# Patient Record
Sex: FEMALE | Race: WHITE | NOT HISPANIC OR LATINO | Employment: FULL TIME | URBAN - METROPOLITAN AREA
[De-identification: names, ages, dates, MRNs, and addresses within clinical notes are randomized per-mention and may not be internally consistent; named-entity substitution may affect disease eponyms.]

---

## 2017-04-24 ENCOUNTER — OFFICE VISIT (OUTPATIENT)
Dept: MEDICAL GROUP | Facility: PHYSICIAN GROUP | Age: 47
End: 2017-04-24
Payer: COMMERCIAL

## 2017-04-24 VITALS
OXYGEN SATURATION: 96 % | DIASTOLIC BLOOD PRESSURE: 80 MMHG | HEIGHT: 71 IN | HEART RATE: 63 BPM | WEIGHT: 200 LBS | BODY MASS INDEX: 28 KG/M2 | TEMPERATURE: 97.9 F | SYSTOLIC BLOOD PRESSURE: 124 MMHG

## 2017-04-24 DIAGNOSIS — J01.00 ACUTE NON-RECURRENT MAXILLARY SINUSITIS: ICD-10-CM

## 2017-04-24 DIAGNOSIS — R09.81 SINUS CONGESTION: ICD-10-CM

## 2017-04-24 DIAGNOSIS — R05.9 COUGH: ICD-10-CM

## 2017-04-24 PROCEDURE — 99214 OFFICE O/P EST MOD 30 MIN: CPT | Performed by: FAMILY MEDICINE

## 2017-04-24 RX ORDER — FLUTICASONE PROPIONATE 50 MCG
1 SPRAY, SUSPENSION (ML) NASAL DAILY
Qty: 16 G | Refills: 1 | Status: SHIPPED | OUTPATIENT
Start: 2017-04-24 | End: 2019-01-30

## 2017-04-24 RX ORDER — AMOXICILLIN 875 MG/1
875 TABLET, COATED ORAL 2 TIMES DAILY
Qty: 20 TAB | Refills: 0 | Status: SHIPPED | OUTPATIENT
Start: 2017-04-24 | End: 2017-05-04

## 2017-04-24 RX ORDER — DEXAMETHASONE 4 MG/1
4 TABLET ORAL 2 TIMES DAILY
Qty: 10 TAB | Refills: 0 | Status: SHIPPED | OUTPATIENT
Start: 2017-04-24 | End: 2018-02-06 | Stop reason: SDUPTHER

## 2017-04-24 ASSESSMENT — PATIENT HEALTH QUESTIONNAIRE - PHQ9: CLINICAL INTERPRETATION OF PHQ2 SCORE: 0

## 2017-04-25 NOTE — PROGRESS NOTES
"Subjective:   Ghazal Dsouza is a 47 y.o. female here today for sinus congestion and cough    Sinus congestion  New problem: started about 1 week ago and is concurrent with cough. She has had sinus drainage along with runny nose; she denies any fever/chills/n/v but does report sick contacts.  She has tried some over the counter medications but these have not been beneficial.     Cough  New problem: started with the sinus congestion.  The cough has been productive.  Again she has tried over the counter medications but nothing has been beneficial.  Associated symptoms of runny nose and sinus congestion.          Current medicines (including changes today)  Current Outpatient Prescriptions   Medication Sig Dispense Refill   • amoxicillin (AMOXIL) 875 MG tablet Take 1 Tab by mouth 2 times a day for 10 days. 20 Tab 0   • dexamethasone (DECADRON) 4 MG Tab Take 1 Tab by mouth 2 times a day. 10 Tab 0   • fluticasone (FLONASE) 50 MCG/ACT nasal spray Spray 1 Spray in nose every day. 16 g 1   • fluticasone (FLONASE) 50 MCG/ACT nasal spray Spray 1 Spray in nose every day. (Patient not taking: Reported on 8/17/2015) 16 g 11   • IBUPROFEN PO Take  by mouth.         No current facility-administered medications for this visit.     She  has a past medical history of Allergy.    ROS   No chest pain, no shortness of breath, no abdominal pain  +sinus congestion and productive cough     Objective:     Blood pressure 124/80, pulse 63, temperature 36.6 °C (97.9 °F), height 1.803 m (5' 11\"), weight 90.719 kg (200 lb), SpO2 96 %. Body mass index is 27.91 kg/(m^2).   Physical Exam:  Alert, oriented in no acute distress.  Eye contact is good, speech goal directed, affect calm  HEENT: conjunctiva non-injected, sclera non-icteric.  Pinna normal. TM pearly gray on R; serous drainage behind the L  Oral mucous membranes pink; oropharynx with erythema   Neck No adenopathy or masses in the neck or supraclavicular regions.  Lungs: clear to " auscultation bilaterally with good excursion.  CV: regular rate and rhythm.  Abdomen: soft, nontender, No CVAT  Ext: no edema, color normal, vascularity normal, temperature normal        Assessment and Plan:   The following treatment plan was discussed     1. Acute non-recurrent maxillary sinusitis      uncontrolled; prescription for amoxicillin, dexamethasone, and flonase. monitor for tolerance and effect.    2. Cough      uncontrolled; symptom secondary to problem #1.    3. Sinus congestion      uncontrolled; symptom secondary to problem #1.        Followup: Return if symptoms worsen or fail to improve.

## 2017-04-25 NOTE — ASSESSMENT & PLAN NOTE
New problem: started about 1 week ago and is concurrent with cough. She has had sinus drainage along with runny nose; she denies any fever/chills/n/v but does report sick contacts.  She has tried some over the counter medications but these have not been beneficial.

## 2017-06-13 ENCOUNTER — PATIENT MESSAGE (OUTPATIENT)
Dept: MEDICAL GROUP | Facility: PHYSICIAN GROUP | Age: 47
End: 2017-06-13

## 2017-09-16 ENCOUNTER — HOSPITAL ENCOUNTER (OUTPATIENT)
Facility: MEDICAL CENTER | Age: 47
End: 2017-09-16
Payer: COMMERCIAL

## 2017-09-17 LAB
ALBUMIN SERPL BCP-MCNC: 3.6 G/DL (ref 3.2–4.9)
ALBUMIN/GLOB SERPL: 0.9 G/DL
ALP SERPL-CCNC: 97 U/L (ref 30–99)
ALT SERPL-CCNC: 16 U/L (ref 2–50)
ANION GAP SERPL CALC-SCNC: 8 MMOL/L (ref 0–11.9)
AST SERPL-CCNC: 19 U/L (ref 12–45)
BDY FAT % MEASURED: 36.3 %
BILIRUB SERPL-MCNC: 1.1 MG/DL (ref 0.1–1.5)
BP DIAS: 80 MMHG
BP SYS: 114 MMHG
BUN SERPL-MCNC: 14 MG/DL (ref 8–22)
CALCIUM SERPL-MCNC: 9.4 MG/DL (ref 8.5–10.5)
CHLORIDE SERPL-SCNC: 101 MMOL/L (ref 96–112)
CHOLEST SERPL-MCNC: 175 MG/DL (ref 100–199)
CO2 SERPL-SCNC: 29 MMOL/L (ref 20–33)
CREAT SERPL-MCNC: 0.85 MG/DL (ref 0.5–1.4)
DIABETES HTDIA: NO
EVENT NAME HTEVT: NORMAL
GFR SERPL CREATININE-BSD FRML MDRD: >60 ML/MIN/1.73 M 2
GLOBULIN SER CALC-MCNC: 3.9 G/DL (ref 1.9–3.5)
GLUCOSE SERPL-MCNC: 100 MG/DL (ref 65–99)
HDLC SERPL-MCNC: 53 MG/DL
HYPERTENSION HTHYP: NO
LDLC SERPL CALC-MCNC: 101 MG/DL
POTASSIUM SERPL-SCNC: 4.9 MMOL/L (ref 3.6–5.5)
PROT SERPL-MCNC: 7.5 G/DL (ref 6–8.2)
SCREENING LOC CITY HTCIT: NORMAL
SCREENING LOC STATE HTSTA: NORMAL
SCREENING LOCATION HTLOC: NORMAL
SODIUM SERPL-SCNC: 138 MMOL/L (ref 135–145)
SUBSCRIBER ID HTSID: NORMAL
TRIGL SERPL-MCNC: 104 MG/DL (ref 0–149)

## 2018-02-06 ENCOUNTER — OFFICE VISIT (OUTPATIENT)
Dept: MEDICAL GROUP | Facility: PHYSICIAN GROUP | Age: 48
End: 2018-02-06
Payer: COMMERCIAL

## 2018-02-06 VITALS
TEMPERATURE: 98.4 F | BODY MASS INDEX: 29.68 KG/M2 | OXYGEN SATURATION: 98 % | SYSTOLIC BLOOD PRESSURE: 124 MMHG | HEIGHT: 71 IN | HEART RATE: 69 BPM | DIASTOLIC BLOOD PRESSURE: 84 MMHG | WEIGHT: 212 LBS

## 2018-02-06 DIAGNOSIS — J02.9 SORE THROAT: ICD-10-CM

## 2018-02-06 DIAGNOSIS — J06.9 VIRAL UPPER RESPIRATORY TRACT INFECTION: ICD-10-CM

## 2018-02-06 PROCEDURE — 99214 OFFICE O/P EST MOD 30 MIN: CPT | Performed by: FAMILY MEDICINE

## 2018-02-06 RX ORDER — DEXAMETHASONE 4 MG/1
4 TABLET ORAL 2 TIMES DAILY
Qty: 10 TAB | Refills: 0 | Status: SHIPPED | OUTPATIENT
Start: 2018-02-06 | End: 2019-01-30

## 2018-02-06 NOTE — LETTER
February 6, 2018         Patient: Ghazal Dsouza   YOB: 1970   Date of Visit: 2/6/2018           To Whom it May Concern:    Ghazal Dsouza was seen in my clinic on 2/6/2018. She may return to work on 2/12/2018.    If you have any questions or concerns, please don't hesitate to call.        Sincerely,           Marbella Porras D.O.  Electronically Signed

## 2018-02-06 NOTE — PROGRESS NOTES
"Subjective:   Ghazal Dsouza is a 48 y.o. female here today for sore throat    Sore throat  New problem - started 4 days ago; also reports runny nose, subjective fever/chills, sinus congestion/pressure, watery eyes, cough, HA - denies n/v/d - tried OTC medication: flonase, mucus reflief and these were mildly beneficial.          Current medicines (including changes today)  Current Outpatient Prescriptions   Medication Sig Dispense Refill   • dexamethasone (DECADRON) 4 MG Tab Take 1 Tab by mouth 2 times a day. 10 Tab 0   • fluticasone (FLONASE) 50 MCG/ACT nasal spray Spray 1 Spray in nose every day. 16 g 1   • fluticasone (FLONASE) 50 MCG/ACT nasal spray Spray 1 Spray in nose every day. 16 g 11   • IBUPROFEN PO Take  by mouth.         No current facility-administered medications for this visit.      She  has a past medical history of Allergy.    ROS   No chest pain, no shortness of breath, no abdominal pain  +sore throat     Objective:     Blood pressure 124/84, pulse 69, temperature 36.9 °C (98.4 °F), height 1.803 m (5' 11\"), weight 96.2 kg (212 lb), SpO2 98 %. Body mass index is 29.57 kg/m².   Physical Exam:  Alert, oriented in no acute distress.  Eye contact is good, speech goal directed, affect calm  HEENT: conjunctiva non-injected, sclera non-icteric.  Pinna normal. TM pearly gray. Posterior oropharynx with mild erythema  Oral mucous membranes pink and moist with no lesions.  Neck No adenopathy or masses in the neck or supraclavicular regions.  Lungs: clear to auscultation bilaterally with good excursion.  CV: regular rate and rhythm.  Abdomen: soft, nontender, No CVAT  Ext: no edema, color normal, vascularity normal, temperature normal        Assessment and Plan:   The following treatment plan was discussed     1. Viral upper respiratory tract infection      Uncontrolled. Reviewed with patient to stay well hydrated and get good rest; prescription for dexamethasone provided; recommend over-the-counter " Mucinex   2. Sore throat      Uncontrolled; symptoms secondary to problem #1       Followup: Return if symptoms worsen or fail to improve.

## 2018-02-06 NOTE — ASSESSMENT & PLAN NOTE
New problem - started 4 days ago; also reports runny nose, subjective fever/chills, sinus congestion/pressure, watery eyes, cough, HA - denies n/v/d - tried OTC medication: flonase, mucus reflief and these were mildly beneficial.

## 2018-09-20 ENCOUNTER — HOSPITAL ENCOUNTER (OUTPATIENT)
Facility: MEDICAL CENTER | Age: 48
End: 2018-09-20
Payer: COMMERCIAL

## 2018-09-20 LAB
BDY FAT % MEASURED: 38.6 %
BP DIAS: 70 MMHG
BP SYS: 120 MMHG
HYPERTENSION HTHYP: NO

## 2018-09-21 LAB
CHOLEST SERPL-MCNC: 174 MG/DL (ref 100–199)
DIABETES HTDIA: NO
EVENT NAME HTEVT: NORMAL
FASTING STATUS PATIENT QL REPORTED: NORMAL
GLUCOSE SERPL-MCNC: 110 MG/DL (ref 65–99)
HDLC SERPL-MCNC: 59 MG/DL
LDLC SERPL CALC-MCNC: 94 MG/DL
SCREENING LOC CITY HTCIT: NORMAL
SCREENING LOC STATE HTSTA: NORMAL
SCREENING LOCATION HTLOC: NORMAL
SUBSCRIBER ID HTSID: NORMAL
TRIGL SERPL-MCNC: 104 MG/DL (ref 0–149)

## 2018-11-27 ENCOUNTER — OFFICE VISIT (OUTPATIENT)
Dept: URGENT CARE | Facility: PHYSICIAN GROUP | Age: 48
End: 2018-11-27
Payer: COMMERCIAL

## 2018-11-27 VITALS
DIASTOLIC BLOOD PRESSURE: 80 MMHG | WEIGHT: 212 LBS | BODY MASS INDEX: 29.68 KG/M2 | SYSTOLIC BLOOD PRESSURE: 124 MMHG | RESPIRATION RATE: 16 BRPM | HEIGHT: 71 IN | TEMPERATURE: 96.8 F | HEART RATE: 62 BPM | OXYGEN SATURATION: 94 %

## 2018-11-27 DIAGNOSIS — J01.00 ACUTE NON-RECURRENT MAXILLARY SINUSITIS: ICD-10-CM

## 2018-11-27 PROCEDURE — 99214 OFFICE O/P EST MOD 30 MIN: CPT | Performed by: PHYSICIAN ASSISTANT

## 2018-11-27 RX ORDER — AMOXICILLIN AND CLAVULANATE POTASSIUM 875; 125 MG/1; MG/1
1 TABLET, FILM COATED ORAL 2 TIMES DAILY
Qty: 14 TAB | Refills: 0 | Status: SHIPPED | OUTPATIENT
Start: 2018-11-27 | End: 2018-12-04

## 2018-11-27 ASSESSMENT — ENCOUNTER SYMPTOMS
SORE THROAT: 1
SHORTNESS OF BREATH: 0
SINUS PRESSURE: 1
SINUS PAIN: 1
SPUTUM PRODUCTION: 0
FEVER: 0
GASTROINTESTINAL NEGATIVE: 1
DIZZINESS: 0
COUGH: 1
MUSCULOSKELETAL NEGATIVE: 1
SWOLLEN GLANDS: 1
CARDIOVASCULAR NEGATIVE: 1
CHILLS: 1
WHEEZING: 0
HEADACHES: 1

## 2018-11-27 NOTE — PROGRESS NOTES
Subjective:      Ghazal Dsouza is a 48 y.o. female who presents with Cough (Wet cough, facial pressure, headache, congestion, sore throat [id number] )            Sinus Problem   This is a new problem. The current episode started 1 to 4 weeks ago. The problem is unchanged. The maximum temperature recorded prior to her arrival was 101 - 101.9 F. The fever has been present for 1 to 2 days. Her pain is at a severity of 5/10. The pain is moderate. Associated symptoms include chills, congestion, coughing, ear pain, headaches, sinus pressure, a sore throat and swollen glands. Pertinent negatives include no shortness of breath. Past treatments include oral decongestants and acetaminophen. The treatment provided mild relief.       PMH:  has a past medical history of Allergy.  MEDS:   Current Outpatient Prescriptions:   •  IBUPROFEN PO, Take  by mouth.  , Disp: , Rfl:   •  dexamethasone (DECADRON) 4 MG Tab, Take 1 Tab by mouth 2 times a day. (Patient not taking: Reported on 11/27/2018), Disp: 10 Tab, Rfl: 0  •  fluticasone (FLONASE) 50 MCG/ACT nasal spray, Spray 1 Spray in nose every day. (Patient not taking: Reported on 11/27/2018), Disp: 16 g, Rfl: 1  •  fluticasone (FLONASE) 50 MCG/ACT nasal spray, Spray 1 Spray in nose every day. (Patient not taking: Reported on 11/27/2018), Disp: 16 g, Rfl: 11  ALLERGIES: No Known Allergies  SURGHX:   Past Surgical History:   Procedure Laterality Date   • PRIMARY C SECTION       SOCHX:  reports that she is a non-smoker but has been exposed to tobacco smoke. She has never used smokeless tobacco. She reports that she drinks alcohol. She reports that she does not use drugs.  FH: family history includes Cancer in her maternal grandfather and paternal aunt; Diabetes in her brother and sister; Heart Disease in her maternal grandfather; Lung Disease in her mother.    Review of Systems   Constitutional: Positive for chills and malaise/fatigue. Negative for fever.   HENT: Positive for  "congestion, ear pain, sinus pain, sinus pressure and sore throat.    Respiratory: Positive for cough. Negative for sputum production, shortness of breath and wheezing.    Cardiovascular: Negative.    Gastrointestinal: Negative.    Musculoskeletal: Negative.    Neurological: Positive for headaches. Negative for dizziness.   All other systems reviewed and are negative.      Medications, Allergies, and current problem list reviewed today in Epic  Family History   Problem Relation Age of Onset   • Lung Disease Mother         COPD   • Diabetes Sister    • Diabetes Brother    • Heart Disease Maternal Grandfather    • Cancer Maternal Grandfather    • Cancer Paternal Aunt         breast ca          Objective:     /80   Pulse 62   Temp 36 °C (96.8 °F) (Temporal)   Resp 16   Ht 1.803 m (5' 11\")   Wt 96.2 kg (212 lb)   SpO2 94%   BMI 29.57 kg/m²      Physical Exam   Constitutional: She is oriented to person, place, and time. She appears well-developed and well-nourished. No distress.   HENT:   Head: Normocephalic and atraumatic.   Right Ear: Hearing, tympanic membrane, external ear and ear canal normal. Tympanic membrane is not erythematous. No decreased hearing is noted.   Left Ear: Hearing, tympanic membrane, external ear and ear canal normal. Tympanic membrane is not erythematous. No decreased hearing is noted.   Nose: Right sinus exhibits maxillary sinus tenderness. Left sinus exhibits maxillary sinus tenderness.   Mouth/Throat: Normal dentition. Posterior oropharyngeal erythema present. No oropharyngeal exudate.   Eyes: Pupils are equal, round, and reactive to light. Conjunctivae and EOM are normal. Right eye exhibits no discharge. Left eye exhibits no discharge. No scleral icterus.   Neck: Normal range of motion. Neck supple.   Cardiovascular: Normal rate, regular rhythm and normal heart sounds.    No murmur heard.  Pulmonary/Chest: Effort normal and breath sounds normal. No respiratory distress. She has no " wheezes.   Lymphadenopathy:        Head (right side): Submandibular adenopathy present.        Head (left side): Submandibular adenopathy present.     She has no cervical adenopathy.        Right cervical: No superficial cervical adenopathy present.       Left cervical: No superficial cervical adenopathy present.   Neurological: She is alert and oriented to person, place, and time.   Skin: Skin is warm, dry and intact. She is not diaphoretic. No cyanosis. Nails show no clubbing.   Psychiatric: She has a normal mood and affect. Her behavior is normal. Judgment and thought content normal.   Nursing note and vitals reviewed.              Assessment/Plan:     1. Acute non-recurrent maxillary sinusitis  amoxicillin-clavulanate (AUGMENTIN) 875-125 MG Tab     Take full course of antibiotic  Tylenol and Motrin for fever and pain  OTC meds as discussed including oral decongestant if tolerated  Increase fluids and rest  Nasal spray, nasal wash, Bozena pot    Return to clinic or go to ED if symptoms worsen or persist. Indications for ED discussed at length. Patient voices understanding. Follow-up with your primary care provider in 3-5 days. Red flags discussed. All side effects of medication discussed including allergic response, GI upset, tendon injury, etc.    Please note that this dictation was created using voice recognition software. I have made every reasonable attempt to correct obvious errors, but I expect that there are errors of grammar and possibly content that I did not discover before finalizing the note.

## 2018-11-27 NOTE — LETTER
November 27, 2018         Patient: Ghazal Dsouza   YOB: 1970   Date of Visit: 11/27/2018           To Whom it May Concern:    Ghazal Dsouza was seen in my clinic on 11/27/2018. She may return to work on Thurs. Nov. 29th.    If you have any questions or concerns, please don't hesitate to call.        Sincerely,           Hernandez Pillai P.A.-C.  Electronically Signed

## 2019-01-30 ENCOUNTER — HOSPITAL ENCOUNTER (OUTPATIENT)
Dept: LAB | Facility: MEDICAL CENTER | Age: 49
End: 2019-01-30
Attending: NURSE PRACTITIONER
Payer: COMMERCIAL

## 2019-01-30 ENCOUNTER — OFFICE VISIT (OUTPATIENT)
Dept: MEDICAL GROUP | Facility: PHYSICIAN GROUP | Age: 49
End: 2019-01-30
Payer: COMMERCIAL

## 2019-01-30 VITALS
HEIGHT: 70 IN | WEIGHT: 216 LBS | OXYGEN SATURATION: 96 % | BODY MASS INDEX: 30.92 KG/M2 | RESPIRATION RATE: 14 BRPM | TEMPERATURE: 97.7 F | HEART RATE: 59 BPM | DIASTOLIC BLOOD PRESSURE: 80 MMHG | SYSTOLIC BLOOD PRESSURE: 128 MMHG

## 2019-01-30 DIAGNOSIS — Z00.00 PREVENTATIVE HEALTH CARE: ICD-10-CM

## 2019-01-30 DIAGNOSIS — J45.20 MILD INTERMITTENT ASTHMA WITHOUT COMPLICATION: ICD-10-CM

## 2019-01-30 DIAGNOSIS — Z12.39 SCREENING FOR BREAST CANCER: ICD-10-CM

## 2019-01-30 PROBLEM — J02.9 SORE THROAT: Status: RESOLVED | Noted: 2018-02-06 | Resolved: 2019-01-30

## 2019-01-30 PROBLEM — R73.01 ELEVATED FASTING GLUCOSE: Status: ACTIVE | Noted: 2019-01-30

## 2019-01-30 PROBLEM — R09.81 SINUS CONGESTION: Status: RESOLVED | Noted: 2017-04-24 | Resolved: 2019-01-30

## 2019-01-30 PROBLEM — R05.9 COUGH: Status: RESOLVED | Noted: 2017-04-24 | Resolved: 2019-01-30

## 2019-01-30 LAB — TSH SERPL DL<=0.005 MIU/L-ACNC: 4.35 UIU/ML (ref 0.38–5.33)

## 2019-01-30 PROCEDURE — 36415 COLL VENOUS BLD VENIPUNCTURE: CPT

## 2019-01-30 PROCEDURE — 99214 OFFICE O/P EST MOD 30 MIN: CPT | Performed by: NURSE PRACTITIONER

## 2019-01-30 PROCEDURE — 84443 ASSAY THYROID STIM HORMONE: CPT

## 2019-01-30 RX ORDER — ALBUTEROL SULFATE 90 UG/1
2 AEROSOL, METERED RESPIRATORY (INHALATION) EVERY 6 HOURS PRN
Qty: 8.5 G | Refills: 3 | Status: SHIPPED | OUTPATIENT
Start: 2019-01-30 | End: 2021-12-10 | Stop reason: SDUPTHER

## 2019-01-30 ASSESSMENT — PATIENT HEALTH QUESTIONNAIRE - PHQ9: CLINICAL INTERPRETATION OF PHQ2 SCORE: 0

## 2019-01-31 NOTE — PROGRESS NOTES
"Chief Complaint   Patient presents with   • Establish Care   • Asthma       Subjective:   Ghazal Dsouza is a 48 y.o. female here today to establish care and  for evaluation and management of:    BMI 29.0-29.9,adult  BMi 30.99    Mild intermittent asthma without complication  Intermittent use of inhaler.  Last used around jose.          Current medicines (including changes today)  Current Outpatient Prescriptions   Medication Sig Dispense Refill   • albuterol 108 (90 Base) MCG/ACT Aero Soln inhalation aerosol Inhale 2 Puffs by mouth every 6 hours as needed for Shortness of Breath. 8.5 g 3   • IBUPROFEN PO Take  by mouth.         No current facility-administered medications for this visit.      She  has a past medical history of Allergy.    ROS as stated in hpi  No chest pain, no shortness of breath, no abdominal pain       Objective:     Blood pressure 128/80, pulse (!) 59, temperature 36.5 °C (97.7 °F), temperature source Temporal, resp. rate 14, height 1.778 m (5' 10\"), weight 98 kg (216 lb), last menstrual period 01/15/2019, SpO2 96 %. Body mass index is 30.99 kg/m².   Physical Exam:  Constitutional: Alert, no distress.  Skin: Warm, dry, good turgor,no cyanosis, no rashes in visible areas.  Eye: Equal, round and reactive, conjunctiva clear, lids normal.  Ears: No tenderness, no discharge.  External canals are without any significant edema or erythema.  Gross auditory acuity is intact.  Nose: symmetrical without tenderness, no discharge.  Mouth/Throat: lips without lesion.  Oropharynx clear.     Range of motion within normal limits.  Neuro: Cranial nerves 2-12 grossly intact.  No sensory deficit.  Respiratory: Unlabored respiratory effort, lungs clear to auscultation, no wheezes, no ronchi.  Cardiovascular: Normal S1, S2, no murmur, no edema.  Abdomen: Soft, non-tender, no masses, no guarding,  no hepatosplenomegaly.  Psych: Alert and oriented x3, mood and judgement.        Assessment and Plan:   The " following treatment plan was discussed    1. BMI 29.0-29.9,adult  This is a ne wproblem to me.  Chronic, ongoing.  Not currently exercising.  She is helping care for her mother who is debilitated. Monitor.     2. Mild intermittent asthma without complication  This is a new problem to me.  Chronic, ongoing. Stable at this time.  Albuterol inhaler sent to pharmacy.  Discussed asthma treatment options, including allergy control.     3. Preventative health care  Due for labs.  TSH ordered. Monitor and follow.   - TSH; Future    4. Screening for breast cancer  Due for mammogram.  Orderprovided.  Monitor and follow results.   - MA-SCREEN MAMMO W/CAD-BILAT; Future      Followup: Return in about 1 year (around 1/30/2020) for Annual.         Educated in proper administration of medication(s) ordered today including safety, possible SE, risks, benefits, rationale and alternatives to therapy.     Please note that this dictation was created using voice recognition software. I have made every reasonable attempt to correct obvious errors, but I expect that there are errors of grammar and possibly content that I did not discover before finalizing the note.

## 2019-05-22 ENCOUNTER — OFFICE VISIT (OUTPATIENT)
Dept: URGENT CARE | Facility: CLINIC | Age: 49
End: 2019-05-22
Payer: COMMERCIAL

## 2019-05-22 VITALS
BODY MASS INDEX: 39.27 KG/M2 | WEIGHT: 230 LBS | HEIGHT: 64 IN | TEMPERATURE: 97.6 F | HEART RATE: 84 BPM | DIASTOLIC BLOOD PRESSURE: 90 MMHG | SYSTOLIC BLOOD PRESSURE: 148 MMHG | RESPIRATION RATE: 16 BRPM

## 2019-05-22 DIAGNOSIS — R10.13 ABDOMINAL PAIN, ACUTE, EPIGASTRIC: Primary | ICD-10-CM

## 2019-05-22 PROCEDURE — 99204 OFFICE O/P NEW MOD 45 MIN: CPT | Performed by: FAMILY MEDICINE

## 2019-05-22 RX ORDER — MAGNESIUM HYDROXIDE/ALUMINUM HYDROXICE/SIMETHICONE 120; 1200; 1200 MG/30ML; MG/30ML; MG/30ML
30 SUSPENSION ORAL ONCE
Status: COMPLETED | OUTPATIENT
Start: 2019-05-22 | End: 2019-05-22

## 2019-05-22 RX ORDER — OMEPRAZOLE 40 MG/1
40 CAPSULE, DELAYED RELEASE ORAL DAILY
Qty: 30 CAPSULE | Refills: 0 | Status: SHIPPED | OUTPATIENT
Start: 2019-05-22

## 2019-05-22 RX ADMIN — MAGNESIUM HYDROXIDE/ALUMINUM HYDROXICE/SIMETHICONE 30 ML: 120; 1200; 1200 SUSPENSION ORAL at 10:16

## 2019-06-17 DIAGNOSIS — R10.13 ABDOMINAL PAIN, ACUTE, EPIGASTRIC: ICD-10-CM

## 2019-06-18 RX ORDER — OMEPRAZOLE 40 MG/1
CAPSULE, DELAYED RELEASE ORAL
Qty: 30 CAPSULE | Refills: 0 | OUTPATIENT
Start: 2019-06-18

## 2019-08-21 ENCOUNTER — OFFICE VISIT (OUTPATIENT)
Dept: URGENT CARE | Facility: CLINIC | Age: 49
End: 2019-08-21
Payer: COMMERCIAL

## 2019-08-21 VITALS
HEART RATE: 92 BPM | SYSTOLIC BLOOD PRESSURE: 148 MMHG | RESPIRATION RATE: 16 BRPM | WEIGHT: 228 LBS | HEIGHT: 64 IN | OXYGEN SATURATION: 98 % | BODY MASS INDEX: 38.93 KG/M2 | TEMPERATURE: 99.6 F | DIASTOLIC BLOOD PRESSURE: 88 MMHG

## 2019-08-21 DIAGNOSIS — K08.89 DENTALGIA: Primary | ICD-10-CM

## 2019-08-21 DIAGNOSIS — K02.9 DENTAL CARIES: ICD-10-CM

## 2019-08-21 PROCEDURE — 99213 OFFICE O/P EST LOW 20 MIN: CPT | Performed by: FAMILY MEDICINE

## 2019-08-21 RX ORDER — AZITHROMYCIN 250 MG/1
TABLET, FILM COATED ORAL
Qty: 6 TABLET | Refills: 0 | Status: SHIPPED | OUTPATIENT
Start: 2019-08-21 | End: 2019-08-26

## 2019-08-21 RX ORDER — PREDNISONE 10 MG/1
TABLET ORAL
Qty: 21 TABLET | Refills: 0 | Status: SHIPPED | OUTPATIENT
Start: 2019-08-21

## 2019-08-21 NOTE — PATIENT INSTRUCTIONS
Prednisone as directed  Given penicillin and clindamycin allergy will give Zithromax  Patient does not have a PCP, she was referred to a PCP at her last visit to urgent care and states no one called her back  I did provide her with another PCPs best and recommended she try contacting an alternative office  I did stress the need for her to establish with PCP  Patient states she will have health insurance and dental insurance next month     Go to the emergency department if symptoms worsen

## 2019-08-21 NOTE — PROGRESS NOTES
[facility]        NAME: Amira Keenan is a 51 y o  female  : 1970    MRN: [id number]  DATE: 2019  TIME: 11:12 AM    Assessment and Plan   Tomer Rod [K08 [id number]]  1  Dentalgia  azithromycin (ZITHROMAX) 250 mg tablet    predniSONE 10 mg tablet   2  Dental caries  azithromycin (ZITHROMAX) 250 mg tablet         Patient Instructions   Patient Instructions   Prednisone as directed  Given penicillin and clindamycin allergy will give Zithromax  Patient does not have a PCP, she was referred to a PCP at her last visit to urgent care and states no one called her back  I did provide her with another PCPs best and recommended she try contacting an alternative office  I did stress the need for her to establish with PCP  Patient states she will have health insurance and dental insurance next month  Go to the emergency department if symptoms worsen        Proceed to  ER if symptoms worsen  Chief Complaint     Chief Complaint   Patient presents with    Cold Like Symptoms     Pt reports 1 week of right ear pain & right sided sore throat  Report HA & intermittent vomiting  History of Present Illness       Patient presents with one-week history of right ear discomfort and right sided facial pain described as an aching  She states she felt feverish with chills on  but did not take her temperature  She has felt nausea x2 days with vomiting x1 day  Patient denies cough, chest tightness, shortness of breath or wheezing  She does have a right sided headache that has been intermittent, no facial swelling  No numbness no tingling no drooping of the corner of the mouth, no ptosis, no decreased sensation      Review of Systems   Review of Systems   Constitutional: Positive for chills  HENT: Positive for ear pain and sore throat  Negative for congestion, drooling, facial swelling, hearing loss, postnasal drip, rhinorrhea, sinus pressure, sinus pain and trouble swallowing  Eyes: Negative  Respiratory: Negative  Cardiovascular: Negative  Gastrointestinal: Negative  Neurological: Positive for headaches  Negative for facial asymmetry, speech difficulty, weakness and numbness  Current Medications       Current Outpatient Medications:     omeprazole (PriLOSEC) 40 MG capsule, Take 1 capsule (40 mg total) by mouth daily, Disp: 30 capsule, Rfl: 0    azithromycin (ZITHROMAX) 250 mg tablet, 2 tablets today then 1 tablet for the next 4 days, Disp: 6 tablet, Rfl: 0    predniSONE 10 mg tablet, Take 6 tablets today, 5 tomorrow, 4 the next day, 3 the next day, 2 the following and 1 the last day with food, Disp: 21 tablet, Rfl: 0    Current Facility-Administered Medications:     lidocaine viscous (XYLOCAINE) 2 % mucosal solution 15 mL, 15 mL, Swish & Swallow, Once, Ericka Marcus DO    Current Allergies     Allergies as of 08/21/2019 - Reviewed 08/21/2019   Allergen Reaction Noted    Clindamycin Swelling 05/22/2019    Penicillins  05/22/2019            The following portions of the patient's history were reviewed and updated as appropriate: allergies, current medications, past family history, past medical history, past social history, past surgical history and problem list      Past Medical History:   Diagnosis Date    GERD (gastroesophageal reflux disease)        No past surgical history on file  No family history on file  Medications have been verified  Objective   /88   Pulse 92   Temp 99 6 °F (37 6 °C)   Resp 16   Ht 5' 4" (1 626 m)   Wt 103 kg (228 lb)   SpO2 98%   BMI 39 14 kg/m²        Physical Exam     Physical Exam   Constitutional: She appears well-developed and well-nourished  No distress  HENT:   Head: Normocephalic and atraumatic  Right Ear: External ear normal    Left Ear: External ear normal    Mouth/Throat: Oropharynx is clear and moist  No oropharyngeal exudate     Mild intranasal mucosal erythema and rhinorrhea, TM clear bilaterally patent canals    Diffuse dental caries, fractured right incisor, quite tender to percussion over most of the right-sided maxillary teeth  Eyes: Conjunctivae are normal    Neck: Normal range of motion  Neck supple  No thyromegaly present  Cardiovascular: Normal rate, regular rhythm and normal heart sounds  Pulmonary/Chest: Effort normal and breath sounds normal    Abdominal: Soft  Bowel sounds are normal    Musculoskeletal: Normal range of motion  Lymphadenopathy:     She has no cervical adenopathy  Neurological: No cranial nerve deficit or sensory deficit   Coordination normal

## 2019-08-21 NOTE — LETTER
August 21, 2019     Patient: Amira Keenan   YOB: 1970   Date of Visit: 8/21/2019       To Whom it May Concern:    Amira Keenan was seen in my clinic on 8/21/2019  She may return to work on 8/22/2019  If you have any questions or concerns, please don't hesitate to call           Sincerely,          Annetta Jack,         CC: Amira Keenan
when needed/public transportation

## 2019-09-25 ENCOUNTER — NON-PROVIDER VISIT (OUTPATIENT)
Dept: URGENT CARE | Facility: PHYSICIAN GROUP | Age: 49
End: 2019-09-25

## 2019-09-25 DIAGNOSIS — Z02.1 PRE-EMPLOYMENT DRUG SCREENING: ICD-10-CM

## 2019-09-25 LAB
AMP AMPHETAMINE: NORMAL
COC COCAINE: NORMAL
INT CON NEG: NEGATIVE
INT CON POS: POSITIVE
MET METHAMPHETAMINES: NORMAL
OPI OPIATES: NORMAL
PCP PHENCYCLIDINE: NORMAL
POC DRUG COMMENT 753798-OCCUPATIONAL HEALTH: NEGATIVE
THC: NORMAL

## 2019-09-25 PROCEDURE — 80305 DRUG TEST PRSMV DIR OPT OBS: CPT | Performed by: PHYSICIAN ASSISTANT

## 2019-12-11 ENCOUNTER — OFFICE VISIT (OUTPATIENT)
Dept: URGENT CARE | Facility: PHYSICIAN GROUP | Age: 49
End: 2019-12-11
Payer: COMMERCIAL

## 2019-12-11 VITALS
HEIGHT: 70 IN | WEIGHT: 199 LBS | OXYGEN SATURATION: 95 % | RESPIRATION RATE: 16 BRPM | DIASTOLIC BLOOD PRESSURE: 80 MMHG | SYSTOLIC BLOOD PRESSURE: 122 MMHG | BODY MASS INDEX: 28.49 KG/M2 | HEART RATE: 68 BPM | TEMPERATURE: 97.8 F

## 2019-12-11 DIAGNOSIS — R05.9 COUGH: ICD-10-CM

## 2019-12-11 DIAGNOSIS — J45.20 MILD INTERMITTENT ASTHMA WITHOUT COMPLICATION: ICD-10-CM

## 2019-12-11 DIAGNOSIS — J40 LARYNGOTRACHEOBRONCHITIS: ICD-10-CM

## 2019-12-11 DIAGNOSIS — H66.001 ACUTE SUPPURATIVE OTITIS MEDIA OF RIGHT EAR WITHOUT SPONTANEOUS RUPTURE OF TYMPANIC MEMBRANE, RECURRENCE NOT SPECIFIED: ICD-10-CM

## 2019-12-11 PROCEDURE — 99214 OFFICE O/P EST MOD 30 MIN: CPT | Performed by: PHYSICIAN ASSISTANT

## 2019-12-11 RX ORDER — DOXYCYCLINE HYCLATE 100 MG
100 TABLET ORAL 2 TIMES DAILY
Qty: 14 TAB | Refills: 0 | Status: SHIPPED | OUTPATIENT
Start: 2019-12-11 | End: 2019-12-18

## 2019-12-11 RX ORDER — BENZONATATE 100 MG/1
200 CAPSULE ORAL 3 TIMES DAILY PRN
Qty: 60 CAP | Refills: 0 | Status: SHIPPED | OUTPATIENT
Start: 2019-12-11 | End: 2021-12-10

## 2019-12-11 RX ORDER — DEXAMETHASONE SODIUM PHOSPHATE 10 MG/ML
10 INJECTION INTRAMUSCULAR; INTRAVENOUS ONCE
Status: COMPLETED | OUTPATIENT
Start: 2019-12-11 | End: 2019-12-11

## 2019-12-11 RX ORDER — METHYLPREDNISOLONE 4 MG/1
TABLET ORAL
Qty: 21 TAB | Refills: 0 | Status: SHIPPED | OUTPATIENT
Start: 2019-12-11 | End: 2021-07-02

## 2019-12-11 RX ORDER — DEXTROMETHORPHAN HYDROBROMIDE AND PROMETHAZINE HYDROCHLORIDE 15; 6.25 MG/5ML; MG/5ML
5 SYRUP ORAL 4 TIMES DAILY PRN
Qty: 100 ML | Refills: 0 | Status: SHIPPED | OUTPATIENT
Start: 2019-12-11 | End: 2019-12-16

## 2019-12-11 RX ADMIN — DEXAMETHASONE SODIUM PHOSPHATE 10 MG: 10 INJECTION INTRAMUSCULAR; INTRAVENOUS at 17:08

## 2019-12-11 ASSESSMENT — ENCOUNTER SYMPTOMS
SHORTNESS OF BREATH: 0
SPUTUM PRODUCTION: 1
FEVER: 1
SINUS PAIN: 1
CHILLS: 1
HEADACHES: 1
SORE THROAT: 1
COUGH: 1
WHEEZING: 0

## 2019-12-11 NOTE — LETTER
December 11, 2019         Patient: Ghazal Dsouza   YOB: 1970   Date of Visit: 12/11/2019           To Whom it May Concern:    Ghazal Dsouza was seen in my clinic on 12/11/2019. She may return to work on 12/13/19..    If you have any questions or concerns, please don't hesitate to call.        Sincerely,           Maria De Jesus Silva P.A.-C.  Electronically Signed

## 2019-12-12 NOTE — PATIENT INSTRUCTIONS
Otitis Media, Adult  Otitis media is redness, soreness, and puffiness (swelling) in the space just behind your eardrum (middle ear). It may be caused by allergies or infection. It often happens along with a cold.  Follow these instructions at home:  · Take your medicine as told. Finish it even if you start to feel better.  · Only take over-the-counter or prescription medicines for pain, discomfort, or fever as told by your doctor.  · Follow up with your doctor as told.  Contact a doctor if:  · You have otitis media only in one ear, or bleeding from your nose, or both.  · You notice a lump on your neck.  · You are not getting better in 3-5 days.  · You feel worse instead of better.  Get help right away if:  · You have pain that is not helped with medicine.  · You have puffiness, redness, or pain around your ear.  · You get a stiff neck.  · You cannot move part of your face (paralysis).  · You notice that the bone behind your ear hurts when you touch it.  This information is not intended to replace advice given to you by your health care provider. Make sure you discuss any questions you have with your health care provider.  Document Released: 06/05/2009 Document Revised: 05/25/2017 Document Reviewed: 07/15/2014  [employer] Patient Education © 2017 [employer]  Bronchitis  Bronchitis is a problem of the air tubes leading to your lungs. This problem makes it hard for air to get in and out of the lungs. You may cough a lot because your air tubes are narrow. Going without care can cause lasting (chronic) bronchitis.  HOME CARE   · Drink enough fluids to keep your pee (urine) clear or pale yellow.  · Use a cool mist humidifier.  · Quit smoking if you smoke. If you keep smoking, the bronchitis might not get better.  · Only take medicine as told by your doctor.  GET HELP RIGHT AWAY IF:   · Coughing keeps you awake.  · You start to wheeze.  · You become more sick or weak.  · You have a hard time breathing or get  short of breath.  · You cough up blood.  · Coughing lasts more than 2 weeks.  · You have a fever.  · Your baby is older than 3 months with a rectal temperature of 102° F (38.9° C) or higher.  · Your baby is 3 months old or younger with a rectal temperature of 100.4° F (38° C) or higher.  MAKE SURE YOU:  · Understand these instructions.  · Will watch your condition.  · Will get help right away if you are not doing well or get worse.  Document Released: 06/05/2009 Document Revised: 03/11/2013 Document Reviewed: 11/19/2010  [employer]® Patient Information ©2014 [employer].  Laryngitis  Introduction  Laryngitis is swelling (inflammation) of your vocal cords. This causes hoarseness, coughing, loss of voice, sore throat, or a dry throat. When your vocal cords are inflamed, your voice sounds different.  Laryngitis can be temporary (acute) or long-term (chronic). Most cases of acute laryngitis improve with time. Chronic laryngitis is laryngitis that lasts for more than three weeks.  Follow these instructions at home:  · Drink enough fluid to keep your pee (urine) clear or pale yellow.  · Breathe in moist air. Use a humidifier if you live in a dry climate.  · Take medicines only as told by your doctor.  · Do not smoke cigarettes or electronic cigarettes. If you need help quitting, ask your doctor.  · Talk as little as possible. Also avoid whispering, which can cause vocal strain.  · Write instead of talking. Do this until your voice is back to normal.  Contact a doctor if:  · You have a fever.  · Your pain is worse.  · You have trouble swallowing.  Get help right away if:  · You cough up blood.  · You have trouble breathing.  This information is not intended to replace advice given to you by your health care provider. Make sure you discuss any questions you have with your health care provider.  Document Released: 12/06/2012 Document Revised: 05/25/2017 Document Reviewed: 06/02/2015  © 2017 [employer]

## 2019-12-12 NOTE — PROGRESS NOTES
"Subjective:   Ghazal Dsouza  is a 49 y.o. female who presents for Cough (x 3 days. sinus pressure, congestion, green phlegm, headache, loss of voice  )    This is a new problem.  Patient presents to urgent care with 3-day history of sore throat with hoarse voice and loss of voice with cough productive of green sputum, congestion, sinus pressure and pain with headache.  Patient reports possible fever with chills and body aches.  Patient does have a history of asthma.  She does not report any wheezing but did try using some albuterol to see if this was beneficial and it was not.      Cough   Associated symptoms include chills, ear pain, a fever, headaches and a sore throat. Pertinent negatives include no chest pain, shortness of breath or wheezing.     Review of Systems   Constitutional: Positive for chills, fever and malaise/fatigue.   HENT: Positive for congestion, ear pain, sinus pain and sore throat.    Respiratory: Positive for cough and sputum production. Negative for shortness of breath and wheezing.    Cardiovascular: Negative for chest pain.   Neurological: Positive for headaches.   All other systems reviewed and are negative.    No Known Allergies  Reviewed past medical, surgical and family history.  Reviewed prescription and over-the-counter medications with patient and electronic health record today.     Objective:   /80   Pulse 68   Temp 36.6 °C (97.8 °F) (Temporal)   Resp 16   Ht 1.778 m (5' 10\")   Wt 90.3 kg (199 lb)   SpO2 95%   BMI 28.55 kg/m²   Physical Exam  Vitals signs reviewed.   Constitutional:       General: She is not in acute distress.     Appearance: She is well-developed. She is not ill-appearing or toxic-appearing.      Comments: Hoarse voice   HENT:      Head: Normocephalic and atraumatic.      Right Ear: Ear canal and external ear normal. A middle ear effusion is present. Tympanic membrane is injected. Tympanic membrane is not bulging.      Left Ear: Tympanic membrane, " ear canal and external ear normal.      Nose: Mucosal edema and congestion present.      Right Turbinates: Swollen.      Left Turbinates: Swollen.      Right Sinus: Maxillary sinus tenderness and frontal sinus tenderness present.      Left Sinus: Maxillary sinus tenderness and frontal sinus tenderness present.      Mouth/Throat:      Lips: Pink. No lesions.      Mouth: Mucous membranes are moist.      Pharynx: Oropharynx is clear. Uvula midline. Posterior oropharyngeal erythema present. No oropharyngeal exudate or uvula swelling.      Tonsils: No tonsillar exudate. Swellin+ on the right. 1+ on the left.   Eyes:      General: Lids are normal.      Extraocular Movements: Extraocular movements intact.      Conjunctiva/sclera: Conjunctivae normal.      Pupils: Pupils are equal, round, and reactive to light.   Neck:      Musculoskeletal: Normal range of motion and neck supple.   Cardiovascular:      Rate and Rhythm: Normal rate and regular rhythm.      Heart sounds: Normal heart sounds. No murmur. No friction rub. No gallop.    Pulmonary:      Effort: Pulmonary effort is normal. No respiratory distress.      Breath sounds: No stridor. Examination of the right-upper field reveals rhonchi. Examination of the left-upper field reveals rhonchi. Rhonchi present. No decreased breath sounds, wheezing or rales.   Abdominal:      General: Bowel sounds are normal. There is no distension.      Palpations: Abdomen is soft. There is no mass.      Tenderness: There is no tenderness. There is no guarding or rebound.   Musculoskeletal: Normal range of motion.         General: No tenderness or deformity.   Lymphadenopathy:      Head:      Right side of head: No submental, submandibular or tonsillar adenopathy.      Left side of head: No submental, submandibular or tonsillar adenopathy.      Cervical: No cervical adenopathy.      Upper Body:      Right upper body: No supraclavicular adenopathy.      Left upper body: No supraclavicular  adenopathy.   Skin:     General: Skin is warm and dry.      Findings: No rash.   Neurological:      Mental Status: She is alert and oriented to person, place, and time.      Cranial Nerves: No cranial nerve deficit.      Sensory: No sensory deficit.      Coordination: Coordination normal.   Psychiatric:         Attention and Perception: Attention normal.         Mood and Affect: Mood and affect normal.         Speech: Speech normal.         Behavior: Behavior normal. Behavior is cooperative.         Thought Content: Thought content normal.         Judgment: Judgment normal.           Assessment/Plan:   1. Laryngotracheobronchitis  - dexamethasone (DECADRON) injection (check route below) 10 mg  - methylPREDNISolone (MEDROL DOSEPAK) 4 MG Tablet Therapy Pack; Follow schedule on package instructions.  Dispense: 21 Tab; Refill: 0  - doxycycline (VIBRAMYCIN) 100 MG Tab; Take 1 Tab by mouth 2 times a day for 7 days.  Dispense: 14 Tab; Refill: 0    2. Acute suppurative otitis media of right ear without spontaneous rupture of tympanic membrane, recurrence not specified    3. Mild intermittent asthma without complication    4. Cough  - benzonatate (TESSALON) 100 MG Cap; Take 2 Caps by mouth 3 times a day as needed.  Dispense: 60 Cap; Refill: 0  - promethazine-dextromethorphan (PROMETHAZINE-DM) 6.25-15 MG/5ML syrup; Take 5 mL by mouth 4 times a day as needed for Cough for up to 5 days.  Dispense: 100 mL; Refill: 0    Patient will be treated with dexamethasone 10 mg IM here in clinic (offered orally patient declines).  She will start Medrol Dosepak tomorrow.  She will be treated with doxycycline for laryngotracheobronchitis.  She is given Tessalon Perles as needed for daytime cough and a small supply of Promethazine DM for nighttime cough.  Recommend addition of Mucinex to regimen.  Increase fluids, rest.  Increase fluids, rest.  Patient may use salt water gargles, ice pops, cool fluids.    May use Tylenol or ibuprofen  over-the-counter as needed for pain or fever not to exceed recommended daily dose.    Differential diagnosis, natural history, supportive care, and indications for immediate follow-up discussed.     Red flag warning symptoms and strict ER/follow-up precautions given.  The patient demonstrated a good understanding and agreed with the treatment plan.  Please note that this note was created using voice recognition speech to text software. Every effort has been made to correct obvious errors.  However, I expect there are errors of grammar and possibly context that were not discovered prior to finalizing the note  VINITA Silva PA-C

## 2020-01-11 ENCOUNTER — OFFICE VISIT (OUTPATIENT)
Dept: URGENT CARE | Facility: PHYSICIAN GROUP | Age: 50
End: 2020-01-11
Payer: COMMERCIAL

## 2020-01-11 VITALS
HEART RATE: 80 BPM | HEIGHT: 70 IN | DIASTOLIC BLOOD PRESSURE: 76 MMHG | WEIGHT: 196 LBS | BODY MASS INDEX: 28.06 KG/M2 | TEMPERATURE: 98.3 F | OXYGEN SATURATION: 93 % | SYSTOLIC BLOOD PRESSURE: 118 MMHG | RESPIRATION RATE: 16 BRPM

## 2020-01-11 DIAGNOSIS — J02.0 STREP PHARYNGITIS: ICD-10-CM

## 2020-01-11 DIAGNOSIS — J02.9 PHARYNGITIS, UNSPECIFIED ETIOLOGY: ICD-10-CM

## 2020-01-11 LAB
INT CON NEG: NORMAL
INT CON POS: NORMAL
S PYO AG THROAT QL: POSITIVE

## 2020-01-11 PROCEDURE — 99214 OFFICE O/P EST MOD 30 MIN: CPT | Performed by: PHYSICIAN ASSISTANT

## 2020-01-11 PROCEDURE — 87880 STREP A ASSAY W/OPTIC: CPT | Performed by: PHYSICIAN ASSISTANT

## 2020-01-11 RX ORDER — AMOXICILLIN 875 MG/1
875 TABLET, COATED ORAL 2 TIMES DAILY
Qty: 20 TAB | Refills: 0 | Status: SHIPPED | OUTPATIENT
Start: 2020-01-11 | End: 2020-01-21

## 2020-01-11 RX ORDER — LIDOCAINE HYDROCHLORIDE 20 MG/ML
15 SOLUTION OROPHARYNGEAL PRN
Qty: 600 ML | Refills: 0 | Status: SHIPPED | OUTPATIENT
Start: 2020-01-11 | End: 2021-07-02

## 2020-01-11 ASSESSMENT — ENCOUNTER SYMPTOMS
TINGLING: 0
COUGH: 0
MYALGIAS: 1
SWOLLEN GLANDS: 1
CHILLS: 1
VOMITING: 0
TROUBLE SWALLOWING: 1
NECK PAIN: 0
EYE DISCHARGE: 0
DIZZINESS: 0
FEVER: 1
HOARSE VOICE: 1
DIARRHEA: 0
SORE THROAT: 1
HEADACHES: 0
WHEEZING: 0
EYE REDNESS: 0

## 2020-01-11 NOTE — PROGRESS NOTES
"Subjective:      Ghazal Dsouza is a 49 y.o. female who presents with Pharyngitis (x 1 day. ear ache, cough, body chills onset last night, tired )            Pharyngitis    This is a new problem. The current episode started yesterday. The problem has been gradually worsening. Neither side of throat is experiencing more pain than the other. Maximum temperature: pos for subjective fevers.  The pain is moderate. Associated symptoms include congestion, a hoarse voice, swollen glands and trouble swallowing. Pertinent negatives include no coughing, diarrhea, ear discharge, headaches, neck pain or vomiting. She has tried acetaminophen, gargles and cool liquids for the symptoms. The treatment provided mild relief.       Review of Systems   Constitutional: Positive for chills, fever and malaise/fatigue.   HENT: Positive for congestion, hoarse voice, sore throat and trouble swallowing. Negative for ear discharge.    Eyes: Negative for discharge and redness.   Respiratory: Negative for cough and wheezing.    Cardiovascular: Negative for chest pain and leg swelling.   Gastrointestinal: Negative for diarrhea and vomiting.   Genitourinary: Negative for dysuria.   Musculoskeletal: Positive for myalgias. Negative for neck pain.   Skin: Negative for itching and rash.   Neurological: Negative for dizziness, tingling and headaches.          Objective:     /76   Pulse 80   Temp 36.8 °C (98.3 °F) (Temporal)   Resp 16   Ht 1.778 m (5' 10\")   Wt 88.9 kg (196 lb)   SpO2 93%   BMI 28.12 kg/m²    PMH:  has a past medical history of Allergy.  MEDS: Reviewed .   ALLERGIES: No Known Allergies  SURGHX:   Past Surgical History:   Procedure Laterality Date   • PRIMARY C SECTION       SOCHX:  reports that she is a non-smoker but has been exposed to tobacco smoke. She has never used smokeless tobacco. She reports current alcohol use. She reports that she does not use drugs.  FH: Family history was reviewed, no pertinent findings to " report    Physical Exam  Vitals signs reviewed.   Constitutional:       Appearance: She is well-developed.   HENT:      Head: Normocephalic and atraumatic.      Right Ear: External ear normal.      Left Ear: External ear normal.      Nose: Nose normal.      Mouth/Throat:      Pharynx: No oropharyngeal exudate.      Tonsils: No tonsillar abscesses.      Comments: Pos PND> with diffuse erythema.     Eyes:      Pupils: Pupils are equal, round, and reactive to light.   Neck:      Musculoskeletal: Normal range of motion and neck supple.      Thyroid: No thyromegaly.   Cardiovascular:      Rate and Rhythm: Normal rate and regular rhythm.   Pulmonary:      Effort: Pulmonary effort is normal. No respiratory distress.      Breath sounds: Normal breath sounds.   Musculoskeletal: Normal range of motion.   Lymphadenopathy:      Cervical: Cervical adenopathy present.   Skin:     General: Skin is warm.      Coloration: Skin is not pale.      Findings: No rash.   Neurological:      Mental Status: She is alert and oriented to person, place, and time.   Psychiatric:         Behavior: Behavior normal.                 Assessment/Plan:       1. Strep pharyngitis  - amoxicillin (AMOXIL) 875 MG tablet; Take 1 Tab by mouth 2 times a day for 10 days.  Dispense: 20 Tab; Refill: 0  - lidocaine (XYLOCAINE) 2 % Solution; Take 15 mL by mouth as needed for Throat/Mouth Pain. Gargle and spit every 4 hours as needed for pain.  Dispense: 600 mL; Refill: 0    2. Pharyngitis, unspecified etiology  - POCT Rapid Strep A  - amoxicillin (AMOXIL) 875 MG tablet; Take 1 Tab by mouth 2 times a day for 10 days.  Dispense: 20 Tab; Refill: 0    Pos. Strep.   . Encouraged OTC supportive meds PRN. Humidification, increase fluids, avoid night time dairy. Change toothbrush.     Patient given precautionary s/sx that mandate immediate follow up and evaluation in the ED. Advised of risks of not doing so.    DDX, Supportive care, and indications for immediate follow-up  discussed with patient.    Instructed to return to clinic or nearest emergency department if we are not available for any change in condition, further concerns, or worsening of symptoms.    The patient demonstrated a good understanding and agreed with the treatment plan.

## 2020-12-01 ENCOUNTER — HOSPITAL ENCOUNTER (OUTPATIENT)
Dept: RADIOLOGY | Facility: MEDICAL CENTER | Age: 50
End: 2020-12-01
Attending: NURSE PRACTITIONER
Payer: COMMERCIAL

## 2020-12-01 DIAGNOSIS — Z12.31 VISIT FOR SCREENING MAMMOGRAM: ICD-10-CM

## 2020-12-01 PROCEDURE — 77067 SCR MAMMO BI INCL CAD: CPT

## 2020-12-04 ENCOUNTER — HOSPITAL ENCOUNTER (OUTPATIENT)
Dept: RADIOLOGY | Facility: MEDICAL CENTER | Age: 50
End: 2020-12-04
Payer: COMMERCIAL

## 2021-06-25 ENCOUNTER — TELEPHONE (OUTPATIENT)
Dept: HEALTH INFORMATION MANAGEMENT | Facility: OTHER | Age: 51
End: 2021-06-25

## 2021-06-25 DIAGNOSIS — Z12.11 SCREEN FOR COLON CANCER: ICD-10-CM

## 2021-06-25 NOTE — TELEPHONE ENCOUNTER
1. Caller Name: Tawnia Lisa Johnson                          Call Back Number: [phone]        How would the patient prefer to be contacted with a response: MyChart message    2. SPECIFIC Action To Be Taken: Referral pending, please sign.    3. Diagnosis/Clinical Reason for Request: Colonoscopy    4. Specialty & Provider Name/Lab/Imaging Location: N/A    5. Is appointment scheduled for requested order/referral: no    Patient was informed they will receive a return phone call from the office ONLY if there are any questions before processing their request. Advised to call back if they haven't received a call from the referral department in 5 days.

## 2021-12-10 ENCOUNTER — OFFICE VISIT (OUTPATIENT)
Dept: MEDICAL GROUP | Facility: PHYSICIAN GROUP | Age: 51
End: 2021-12-10
Payer: COMMERCIAL

## 2021-12-10 VITALS
OXYGEN SATURATION: 97 % | TEMPERATURE: 98.8 F | HEART RATE: 60 BPM | HEIGHT: 70 IN | SYSTOLIC BLOOD PRESSURE: 138 MMHG | RESPIRATION RATE: 14 BRPM | DIASTOLIC BLOOD PRESSURE: 80 MMHG | BODY MASS INDEX: 30.64 KG/M2 | WEIGHT: 214 LBS

## 2021-12-10 DIAGNOSIS — Z13.220 ENCOUNTER FOR LIPID SCREENING FOR CARDIOVASCULAR DISEASE: ICD-10-CM

## 2021-12-10 DIAGNOSIS — Z13.6 ENCOUNTER FOR LIPID SCREENING FOR CARDIOVASCULAR DISEASE: ICD-10-CM

## 2021-12-10 DIAGNOSIS — Z12.4 SCREENING FOR CERVICAL CANCER: ICD-10-CM

## 2021-12-10 DIAGNOSIS — J45.30 MILD PERSISTENT ASTHMA WITHOUT COMPLICATION: ICD-10-CM

## 2021-12-10 DIAGNOSIS — R73.01 ELEVATED FASTING GLUCOSE: ICD-10-CM

## 2021-12-10 DIAGNOSIS — Z13.228 SCREENING FOR METABOLIC DISORDER: ICD-10-CM

## 2021-12-10 DIAGNOSIS — Z12.31 ENCOUNTER FOR SCREENING MAMMOGRAM FOR MALIGNANT NEOPLASM OF BREAST: ICD-10-CM

## 2021-12-10 PROCEDURE — 99214 OFFICE O/P EST MOD 30 MIN: CPT | Performed by: STUDENT IN AN ORGANIZED HEALTH CARE EDUCATION/TRAINING PROGRAM

## 2021-12-10 RX ORDER — DEXAMETHASONE 4 MG/1
1 TABLET ORAL 2 TIMES DAILY
Qty: 1 EACH | Refills: 3 | Status: SHIPPED
Start: 2021-12-10

## 2021-12-10 RX ORDER — ALBUTEROL SULFATE 90 UG/1
2 AEROSOL, METERED RESPIRATORY (INHALATION) EVERY 6 HOURS PRN
Qty: 8.5 G | Refills: 3 | Status: SHIPPED
Start: 2021-12-10

## 2021-12-10 ASSESSMENT — PATIENT HEALTH QUESTIONNAIRE - PHQ9: CLINICAL INTERPRETATION OF PHQ2 SCORE: 0

## 2021-12-11 NOTE — PROGRESS NOTES
New to Provider  (and [facility])      Ghazal Dsouza is a 51 y.o. female.    Reason for visit: New patient to establish         - Prior PCP:  BLOSSOM Rollins ([facility])     (provider is moving).     Chief Complaint   Patient presents with   • Establish Care             Problems discussed this visit:    This note uses problem-based documentation.  Subjective HPI is included in Assessment & Plan, at bottom of note.   Problem   Elevated Fasting Glucose   Mild Persistent Asthma Without Complication   Bmi 30.0-30.9,Adult   TMJ (Temporomandibular Joint Disorder) (Resolved)                          Past Medical History:   Diagnosis Date   • Allergy    • Asthma        Past Surgical History:   Procedure Laterality Date   • DENTAL EXTRACTION(S)      wisdom teeth   • PRIMARY C SECTION         Family History   Problem Relation Age of Onset   • Lung Disease Mother         COPD, in smoker   • Diabetes Sister    • Cancer Sister         breast cancer   • Diabetes Brother    • Heart Disease Maternal Grandfather    • Cancer Maternal Grandfather         leukemia   • Cancer Paternal Aunt         breast ca       Social History     Tobacco Use   • Smoking status: Passive Smoke Exposure - Never Smoker   • Smokeless tobacco: Never Used   • Tobacco comment: mother used to smoke in the house/now  smokes outside   Substance Use Topics   • Alcohol use: Not Currently       Current medications:  (including new changes):  Current Outpatient Medications   Medication Sig Dispense Refill   • fluticasone (FLOVENT HFA) 110 MCG/ACT Aerosol Inhale 1 Puff 2 times a day. 1 Each 3   • albuterol 108 (90 Base) MCG/ACT Aero Soln inhalation aerosol Inhale 2 Puffs every 6 hours as needed for Shortness of Breath. 8.5 g 3     No current facility-administered medications for this visit.       Allergies as of 12/10/2021   • (No Known Allergies)                    Review of Symptoms  (as noted elsewhere, and .....)    GEN/CONST:    "Denies fever, chills,    CARDIO:   Denies chest pain, or edema.    RESP:   Denies shortness of breath, or coughing.  GI:    Denies nausea, vomiting, diarrhea,      PSYCH:  Denies anxiety, depression,         Physical Exam  /80 (BP Location: Right arm, Patient Position: Sitting, BP Cuff Size: Adult)   Pulse 60   Temp 37.1 °C (98.8 °F) (Temporal)   Resp 14   Ht 1.778 m (5' 10\")   Wt 97.1 kg (214 lb)   SpO2 97%   BMI 30.71 kg/m²   General:  Alert and oriented, No apparent distress.  Neck: Supple. No lymphadenopathy noted. Thyroid not enlarged.   Lungs: Clear to auscultation bilaterally.  No wheezes or rales.     Cardiovascular: Regular rate and rhythm.  No murmurs, or gallops.  Abdomen:  Soft.  Non-distended.  Non-tender.     Extremities: No leg edema.  Good general motion of extremities.    Psychological:  Appears to have normal mood and affect.      Labs:  OLD /  Last-Prior labs reviewed.     CMP, Lipids and TSH     - new labs ordered....                            Assessment & Plan  (with subjective history and orders):  Of note, the list below is not in a specific order.      Problem List Items Addressed This Visit         Mild persistent asthma without complication     Asthma - chronic and persistent, but well controlled.   ... (prior to Flovent, used albuterol, a couple times a day).  Recently using Flovent once nightly (not twice daily).     And only using albuterol twice a month.   Currently needing refill of Flovent (but uncertain of dose)      (previously outside med; not in our system).   - Will give Flovent 110  (with Rx for BID)   (but can use nightly if sufficient control)   (as she is using it that way currently).   - will refill PRN-Albuterol.  - follow-up if any aggravation of trouble managing.      (Rx's given on paper, as she wishes to compare)     (pharmacy pricing, with insurance, before filling them.)     (she will let us know which pharmacy, for future refills).          Relevant " Medications    fluticasone (FLOVENT HFA) 110 MCG/ACT Aerosol    albuterol 108 (90 Base) MCG/ACT Aero Soln inhalation aerosol    Other Relevant Orders    CBC WITH DIFFERENTIAL    Comp Metabolic Panel      Elevated fasting glucose     Prior labs with mild elevation of glucose.  Has not had A1c done.  Denies:  polyphagia, polydipsia,   - get labs with A1c, occasionally.          Relevant Orders    HEMOGLOBIN A1C     Other Visit Diagnoses     Screening for metabolic disorder        Relevant Orders    CBC WITH DIFFERENTIAL    Comp Metabolic Panel    Encounter for lipid screening for cardiovascular disease        Relevant Orders    Lipid Profile    Encounter for screening mammogram for malignant neoplasm of breast        Relevant Orders    MA-SCREENING MAMMO BILAT W/TOMOSYNTHESIS W/CAD    Screening for cervical cancer        Relevant Orders    Referral to Gynecology        Of note, the above list is not in a specific nor sortable order.                  Diagnosis Table, with orders:  1. Mild persistent asthma without complication  fluticasone (FLOVENT HFA) 110 MCG/ACT Aerosol    albuterol 108 (90 Base) MCG/ACT Aero Soln inhalation aerosol    CBC WITH DIFFERENTIAL    Comp Metabolic Panel   2. Elevated fasting glucose  HEMOGLOBIN A1C   3. Screening for metabolic disorder  CBC WITH DIFFERENTIAL    Comp Metabolic Panel   4. Encounter for lipid screening for cardiovascular disease  Lipid Profile   5. Encounter for screening mammogram for malignant neoplasm of breast  MA-SCREENING MAMMO BILAT W/TOMOSYNTHESIS W/CAD   6. Screening for cervical cancer  Referral to Gynecology                 Follow-up:   as needed or in one year (next Winter).                 A computerized dictation system may have been used in this note.    Despite review, there may be some errors in spelling or grammar.    Lavell Mcdaniels M.D.  12/10/2021

## 2021-12-11 NOTE — PATIENT INSTRUCTIONS
Please get the new refills and let us know which pharmacy you prefer (before needing a refill).      Please get the labs done in the next few days.  Please get them done while fasting (no food, coffee, or juices, for 8 hours - but water is ok).     Please return for any new or concerning issues, or in one year (next Winter).   Thank you.

## 2021-12-11 NOTE — ASSESSMENT & PLAN NOTE
Prior labs with mild elevation of glucose.  Has not had A1c done.  Denies:  polyphagia, polydipsia,   - get labs with A1c, occasionally.

## 2021-12-23 ENCOUNTER — HOSPITAL ENCOUNTER (OUTPATIENT)
Dept: LAB | Facility: MEDICAL CENTER | Age: 51
End: 2021-12-23
Attending: STUDENT IN AN ORGANIZED HEALTH CARE EDUCATION/TRAINING PROGRAM
Payer: COMMERCIAL

## 2021-12-23 DIAGNOSIS — Z13.228 SCREENING FOR METABOLIC DISORDER: ICD-10-CM

## 2021-12-23 DIAGNOSIS — R73.01 ELEVATED FASTING GLUCOSE: ICD-10-CM

## 2021-12-23 DIAGNOSIS — Z13.220 ENCOUNTER FOR LIPID SCREENING FOR CARDIOVASCULAR DISEASE: ICD-10-CM

## 2021-12-23 DIAGNOSIS — Z13.6 ENCOUNTER FOR LIPID SCREENING FOR CARDIOVASCULAR DISEASE: ICD-10-CM

## 2021-12-23 DIAGNOSIS — J45.30 MILD PERSISTENT ASTHMA WITHOUT COMPLICATION: ICD-10-CM

## 2021-12-23 LAB
ALBUMIN SERPL BCP-MCNC: 4.5 G/DL (ref 3.2–4.9)
ALBUMIN/GLOB SERPL: 1.6 G/DL
ALP SERPL-CCNC: 97 U/L (ref 30–99)
ALT SERPL-CCNC: 20 U/L (ref 2–50)
ANION GAP SERPL CALC-SCNC: 11 MMOL/L (ref 7–16)
AST SERPL-CCNC: 21 U/L (ref 12–45)
BASOPHILS # BLD AUTO: 1.2 % (ref 0–1.8)
BASOPHILS # BLD: 0.1 K/UL (ref 0–0.12)
BILIRUB SERPL-MCNC: 0.8 MG/DL (ref 0.1–1.5)
BUN SERPL-MCNC: 10 MG/DL (ref 8–22)
CALCIUM SERPL-MCNC: 9.3 MG/DL (ref 8.5–10.5)
CHLORIDE SERPL-SCNC: 100 MMOL/L (ref 96–112)
CHOLEST SERPL-MCNC: 196 MG/DL (ref 100–199)
CO2 SERPL-SCNC: 27 MMOL/L (ref 20–33)
CREAT SERPL-MCNC: 0.78 MG/DL (ref 0.5–1.4)
EOSINOPHIL # BLD AUTO: 0.28 K/UL (ref 0–0.51)
EOSINOPHIL NFR BLD: 3.5 % (ref 0–6.9)
ERYTHROCYTE [DISTWIDTH] IN BLOOD BY AUTOMATED COUNT: 38.1 FL (ref 35.9–50)
EST. AVERAGE GLUCOSE BLD GHB EST-MCNC: 108 MG/DL
FASTING STATUS PATIENT QL REPORTED: NORMAL
GLOBULIN SER CALC-MCNC: 2.9 G/DL (ref 1.9–3.5)
GLUCOSE SERPL-MCNC: 107 MG/DL (ref 65–99)
HBA1C MFR BLD: 5.4 % (ref 4–5.6)
HCT VFR BLD AUTO: 48.9 % (ref 37–47)
HDLC SERPL-MCNC: 69 MG/DL
HGB BLD-MCNC: 15.8 G/DL (ref 12–16)
IMM GRANULOCYTES # BLD AUTO: 0.03 K/UL (ref 0–0.11)
IMM GRANULOCYTES NFR BLD AUTO: 0.4 % (ref 0–0.9)
LDLC SERPL CALC-MCNC: 107 MG/DL
LYMPHOCYTES # BLD AUTO: 1.86 K/UL (ref 1–4.8)
LYMPHOCYTES NFR BLD: 23.2 % (ref 22–41)
MCH RBC QN AUTO: 27.5 PG (ref 27–33)
MCHC RBC AUTO-ENTMCNC: 32.3 G/DL (ref 33.6–35)
MCV RBC AUTO: 85 FL (ref 81.4–97.8)
MONOCYTES # BLD AUTO: 0.51 K/UL (ref 0–0.85)
MONOCYTES NFR BLD AUTO: 6.4 % (ref 0–13.4)
NEUTROPHILS # BLD AUTO: 5.24 K/UL (ref 2–7.15)
NEUTROPHILS NFR BLD: 65.3 % (ref 44–72)
NRBC # BLD AUTO: 0 K/UL
NRBC BLD-RTO: 0 /100 WBC
PLATELET # BLD AUTO: 256 K/UL (ref 164–446)
PMV BLD AUTO: 10.8 FL (ref 9–12.9)
POTASSIUM SERPL-SCNC: 3.9 MMOL/L (ref 3.6–5.5)
PROT SERPL-MCNC: 7.4 G/DL (ref 6–8.2)
RBC # BLD AUTO: 5.75 M/UL (ref 4.2–5.4)
SODIUM SERPL-SCNC: 138 MMOL/L (ref 135–145)
TRIGL SERPL-MCNC: 101 MG/DL (ref 0–149)
WBC # BLD AUTO: 8 K/UL (ref 4.8–10.8)

## 2021-12-23 PROCEDURE — 85025 COMPLETE CBC W/AUTO DIFF WBC: CPT

## 2021-12-23 PROCEDURE — 80061 LIPID PANEL: CPT

## 2021-12-23 PROCEDURE — 36415 COLL VENOUS BLD VENIPUNCTURE: CPT

## 2021-12-23 PROCEDURE — 83036 HEMOGLOBIN GLYCOSYLATED A1C: CPT

## 2021-12-23 PROCEDURE — 80053 COMPREHEN METABOLIC PANEL: CPT

## 2022-01-28 ENCOUNTER — HOSPITAL ENCOUNTER (OUTPATIENT)
Dept: RADIOLOGY | Facility: MEDICAL CENTER | Age: 52
End: 2022-01-28
Attending: STUDENT IN AN ORGANIZED HEALTH CARE EDUCATION/TRAINING PROGRAM
Payer: COMMERCIAL

## 2022-01-28 DIAGNOSIS — Z12.31 ENCOUNTER FOR SCREENING MAMMOGRAM FOR MALIGNANT NEOPLASM OF BREAST: ICD-10-CM

## 2022-01-28 PROCEDURE — 77063 BREAST TOMOSYNTHESIS BI: CPT

## 2023-06-21 ENCOUNTER — HOSPITAL ENCOUNTER (OUTPATIENT)
Dept: RADIOLOGY | Facility: MEDICAL CENTER | Age: 53
End: 2023-06-21
Attending: STUDENT IN AN ORGANIZED HEALTH CARE EDUCATION/TRAINING PROGRAM
Payer: COMMERCIAL

## 2023-06-21 DIAGNOSIS — Z12.31 VISIT FOR SCREENING MAMMOGRAM: ICD-10-CM

## 2023-06-21 PROCEDURE — 77063 BREAST TOMOSYNTHESIS BI: CPT

## 2023-07-25 ENCOUNTER — HOSPITAL ENCOUNTER (OUTPATIENT)
Dept: LAB | Facility: MEDICAL CENTER | Age: 53
End: 2023-07-25
Payer: COMMERCIAL

## 2023-07-25 PROCEDURE — 88175 CYTOPATH C/V AUTO FLUID REDO: CPT

## 2023-07-25 PROCEDURE — 87624 HPV HI-RISK TYP POOLED RSLT: CPT

## 2023-07-27 LAB
CYTOLOGIST CVX/VAG CYTO: NORMAL
CYTOLOGY CVX/VAG DOC CYTO: NORMAL
CYTOLOGY CVX/VAG DOC THIN PREP: NORMAL
HPV I/H RISK 4 DNA CVX QL PROBE+SIG AMP: NEGATIVE
NOTE NL11727A: NORMAL
OTHER STN SPEC: NORMAL
QC REVIEWED BY NL11722A: NORMAL
STAT OF ADQ CVX/VAG CYTO-IMP: NORMAL

## 2023-09-13 ENCOUNTER — HOSPITAL ENCOUNTER (OUTPATIENT)
Dept: RADIOLOGY | Facility: MEDICAL CENTER | Age: 53
End: 2023-09-13
Payer: COMMERCIAL

## 2023-09-13 DIAGNOSIS — N64.4 BREAST PAIN: ICD-10-CM

## 2023-09-13 DIAGNOSIS — R92.30 BREAST DENSITY: ICD-10-CM

## 2023-09-13 PROCEDURE — 76642 ULTRASOUND BREAST LIMITED: CPT | Mod: RT

## 2024-01-24 ENCOUNTER — HOSPITAL ENCOUNTER (OUTPATIENT)
Dept: RADIOLOGY | Facility: MEDICAL CENTER | Age: 54
End: 2024-01-24
Attending: NURSE PRACTITIONER
Payer: COMMERCIAL

## 2024-01-24 DIAGNOSIS — M54.50 LOW BACK PAIN, UNSPECIFIED BACK PAIN LATERALITY, UNSPECIFIED CHRONICITY, UNSPECIFIED WHETHER SCIATICA PRESENT: ICD-10-CM

## 2024-01-24 PROCEDURE — 72100 X-RAY EXAM L-S SPINE 2/3 VWS: CPT

## 2024-11-21 NOTE — ASSESSMENT & PLAN NOTE
Lab Results   Component Value Date    HGBA1C 6.2 09/25/2024       Orders:    IRIS Diabetic eye exam     New problem: started with the sinus congestion.  The cough has been productive.  Again she has tried over the counter medications but nothing has been beneficial.  Associated symptoms of runny nose and sinus congestion.